# Patient Record
Sex: FEMALE | Race: AMERICAN INDIAN OR ALASKA NATIVE | HISPANIC OR LATINO | Employment: FULL TIME | URBAN - METROPOLITAN AREA
[De-identification: names, ages, dates, MRNs, and addresses within clinical notes are randomized per-mention and may not be internally consistent; named-entity substitution may affect disease eponyms.]

---

## 2023-06-15 ENCOUNTER — TELEPHONE (OUTPATIENT)
Dept: PLASTIC SURGERY | Facility: CLINIC | Age: 43
End: 2023-06-15
Payer: COMMERCIAL

## 2023-06-15 DIAGNOSIS — F64.0 GENDER DYSPHORIA IN ADULT: Primary | ICD-10-CM

## 2023-06-15 NOTE — TELEPHONE ENCOUNTER
[facility] Call Center    Phone Message    May a detailed message be left on voicemail: yes     Reason for Call: Other: Online request - Facial feminization surgeries + Gender Reassignment       Pronouns: She/Her/Hers     Action Taken: Message routed to:  [facility] ([facility] Care     Travel Screening: Not Applicable

## 2023-06-16 NOTE — TELEPHONE ENCOUNTER
FUTURE VISIT INFORMATION      FUTURE VISIT INFORMATION:    Date: 8/21/23    Time: 9:00am    [place] [facility] [place] INFORMATION:    Reason for visit/diagnosis  virtual full depth vaginoplasty     RECORDS REQUESTED FROM:       No recs to collect

## 2023-06-27 ENCOUNTER — TELEPHONE (OUTPATIENT)
Dept: PLASTIC SURGERY | Facility: CLINIC | Age: 43
End: 2023-06-27
Payer: COMMERCIAL

## 2023-06-27 NOTE — CONFIDENTIAL NOTE
Writer called re: need to reschedule 8/21/23 virtual consult with Deena Wallace. Proivider will be out of office. Pt rescheduled for 7/31/23.

## 2023-07-03 NOTE — TELEPHONE ENCOUNTER
FUTURE VISIT INFORMATION      FUTURE VISIT INFORMATION:    Date: 7/31/23    Time: 9:00am    [place] [facility] [place]  REFERRAL INFORMATION:    Reason for visit/diagnosis  virtual full depth vaginoplasty      RECORDS REQUESTED FROM:         No recs to collect

## 2023-07-18 ENCOUNTER — MEDICAL CORRESPONDENCE (OUTPATIENT)
Dept: HEALTH INFORMATION MANAGEMENT | Facility: CLINIC | Age: 43
End: 2023-07-18

## 2023-07-31 ENCOUNTER — PRE VISIT (OUTPATIENT)
Dept: PLASTIC SURGERY | Facility: CLINIC | Age: 43
End: 2023-07-31

## 2023-07-31 ENCOUNTER — VIRTUAL VISIT (OUTPATIENT)
Dept: PLASTIC SURGERY | Facility: CLINIC | Age: 43
End: 2023-07-31
Attending: UROLOGY
Payer: COMMERCIAL

## 2023-07-31 VITALS — HEIGHT: 71 IN | BODY MASS INDEX: 36.4 KG/M2 | WEIGHT: 260 LBS

## 2023-07-31 DIAGNOSIS — F64.0 GENDER DYSPHORIA IN ADULT: ICD-10-CM

## 2023-07-31 PROCEDURE — 99205 OFFICE O/P NEW HI 60 MIN: CPT | Mod: VID | Performed by: NURSE PRACTITIONER

## 2023-07-31 RX ORDER — PROGESTERONE 200 MG/1
200 CAPSULE ORAL
COMMUNITY
Start: 2022-11-16 | End: 2023-11-21

## 2023-07-31 RX ORDER — ESTRADIOL 2 MG/1
3 TABLET ORAL
COMMUNITY
Start: 2021-06-09 | End: 2024-05-03

## 2023-07-31 ASSESSMENT — PAIN SCALES - GENERAL: PAINLEVEL: NO PAIN (0)

## 2023-07-31 ASSESSMENT — PATIENT HEALTH QUESTIONNAIRE - PHQ9: SUM OF ALL RESPONSES TO PHQ QUESTIONS 1-9: 23

## 2023-07-31 NOTE — LETTER
7/31/2023       RE: Wandy Velazco  [address]  [facility] [address]       Dear Colleague,    Thank you for referring your patient, Wandy Velazco, to the [facility] [place] [facility]. Please see a copy of my visit note below.    Name: Wandy Velazco     MRN: [id number]   YOB: 1980                 Chief Complaint:   Gender Dysphoria            History of Present Illness:   Wandy is a 42 year old transgender female seen in consultation for gender dysphoria    Patient transitioned starting fall 2020  Preferred pronouns are: she/her/hers  The patient has been on exogenous hormones since: July 2021 (estradiol and progesterone). Off feliz for approx 10 months now.  In terms of an intimate relationship, the patient does not have a current significant other. Her oldest lives with her ex wife.  In terms of fertility, the patient: has three biologic children. She lives with her two youngest children    (New)  The patient has obtained one letter of support from her therapist. It has not yet been reviewed     (Prior Surgery)  The patient has previously undergone no gender surgeries.  Breast aug (consult in Oct with Gudelia).     Long-term surgical goals for the patient include: breast augmentation and full depth vaginoplasty    The patient is here today expressing interest in full depth vaginoplasty.    The patient has not begun hair removal. She lives in [place], [place] and resources for hair removal are limited    She works as an  with [employer].         Past Medical History:   No past medical history on file.  Depression - HRT and therapy are helping  Gender dysphoria           Past Surgical History:   No past surgical history on file.         Social History:     Social History     Tobacco Use    Smoking status: Former     Packs/day: 0.50     Types: Cigarettes, Clove cigarettes or kreteks  "    Quit date: 1/1/2008     Years since quitting: 15.5    Smokeless tobacco: Never   Substance Use Topics    Alcohol use: Not on file            Family History:   No family history on file.           Allergies:   No Known Allergies         Medications:     Current Outpatient Medications   Medication Sig    estradiol (ESTRACE) 2 MG tablet Take 3 mg by mouth    progesterone (PROMETRIUM) 200 MG capsule Take 200 mg by mouth     No current facility-administered medications for this visit.             Review of Systems:    ROS: ROS neg other than the symptoms noted above in the HPI.          Physical Exam:   Ht 1.803 m (5' 11\")   Wt 117.9 kg (260 lb)   BMI 36.26 kg/m    General: age-appropriate in NAD  HEENT: Head AT/NC, CN Grossly intact  Resp: no respiratory distress  :  exam deferred   Neuro: grossly intact           Assessment and Plan:   42 year old transgender female with gender dysphoria    The criteria for genital surgery are specific to the type of surgery being requested.  Criteria for bottom surgery:    1. Persistent, well documented gender dysphoria;  2. Capacity to make a fully informed decision and to consent for treatment;  3. Age of consent (>18 years old)  4. If significant medical or mental health concerns are present, they must be well controlled.  5. 12 continuous months of hormone therapy as appropriate to the patient s gender goals (unless  the patient has a medical contraindication or is otherwise unable or unwilling to take  Hormones).  6. Two letters of support    The aim of hormone therapy prior to gonadectomy is primarily to introduce a period of reversible  estrogen or testosterone suppression, before the patient undergoes irreversible surgical intervention.    I reviewed the steps of orchiectomy. I reviewed the surgical procedure. I reviewed the risks and benefits including bleeding, infection and irreversible nature of the procedure. The patient would like orchi as part of " vaginoplasty.    Hair removal is a requirement prior to full depth vaginoplasty as the genital skin will be placed in the vaginal cavity. Lack of hair removal would lead to complications related to intravaginal hair. This is nearly impossible to remove postoperatively.    She has a persistent, well documented gender dysphoria. She has capacity to make a fully informed decision and to consent for treatment. Her mental health issues are well controlled. She has been on continuous hormones for years. She has one letter of support.     The patient meets all of these criteria. We discussed that gender affirmation surgery should be considered permanent. We discussed risks/complications of rectal injury, rectovaginal fistula, bleeding, fluid collection, infection, injury to surrounding structures, flap loss, sensory loss, wound dehiscence, vaginal prolapse, vaginal shrinkage/stenosis, need for lifelong dilation, urinary stream abnormalities, DVT/PE and need for revision surgery.     We discussed the option for minimal depth and full depth. She would like full depth vaginoplasty     We also discussed the need to stop hormones jhonny-procedurally for 1 week before and after surgery.     We discussed that transgender vaginoplasty for this patient would include: penectomy, orchiectomy, clitoroplasty, labiaplasty, urethral reconstruction, creation of a vagina, skin graft, colpopexy to suspend the vagina, and scrotectomy.       Needs hair removal  Not ready for prior auth      Plan: Patient will get started with hair removal and upload LOS for review. RTC for hair check once done with hair removal    F/U: Patient to contact us once done with hair removal to schedule hair check exam with Dr Witt    60 minutes spent on day of encounter doing chart review, history and exam, consultation and education, and additional activities as including in note above.          Again, thank you for allowing me to participate in the care of your  patient.      Sincerely,    KYARA Ruiz CNP

## 2023-07-31 NOTE — NURSING NOTE
Is the patient currently in the state of [place]? YES    Visit mode:VIDEO    If the visit is dropped, the patient can be reconnected by: VIDEO VISIT: Text to cell phone: [phone]    Will anyone else be joining the visit? NO      How would you like to obtain your AVS? MyChart    Are changes needed to the allergy or medication list? NO    Reason for visit: Consult    Depression Response    Patient completed the PHQ-9 assessment for depression and scored >9? Yes  Question 9 on the PHQ-9 was positive for suicidality? Yes  Does patient have current mental health provider? Yes    Is this a virtual visit? Yes   Does patient have suicidal ideation (positive question 9)? No - offer to place Mental Health Referral.  Patient declined referral/not needed    I personally notified the following: visit provider

## 2023-07-31 NOTE — PATIENT INSTRUCTIONS
It was wonderful to meet you, Wnady.  Please upload your letter of support through [employer] for review.   It is likely we may need an updated letter for your prior authorization, since the letters ar only good for 1 year. It is helpful for us to review your current letter, though, so we can suggest any necessary updates if needed.  Our comprehensive gender care team is here to support you throughout this process. Please reach out with any questions or concerns.  Thank you for trusting us with your care!

## 2023-07-31 NOTE — PROGRESS NOTES
Virtual Visit Details    Type of service:  Video Visit     Originating Location (pt. Location): Home    Distant Location (provider location):  Off-site  Platform used for Video Visit: [employer]  Video start: 0858  Video end: 0945        Name: Wandy Velazco     MRN: [id number]   YOB: 1980                 Chief Complaint:   Gender Dysphoria            History of Present Illness:   Wandy is a 42 year old transgender female seen in consultation for gender dysphoria    Patient transitioned starting fall 2020  Preferred pronouns are: she/her/hers  The patient has been on exogenous hormones since: July 2021 (estradiol and progesterone). Off feliz for approx 10 months now.  In terms of an intimate relationship, the patient does not have a current significant other. Her oldest lives with her ex wife.  In terms of fertility, the patient: has three biologic children. She lives with her two youngest children    (New)  The patient has obtained one letter of support from her therapist. It has not yet been reviewed     (Prior Surgery)  The patient has previously undergone no gender surgeries.  Breast aug (consult in Oct with Gudelia).     Long-term surgical goals for the patient include: breast augmentation and full depth vaginoplasty    The patient is here today expressing interest in full depth vaginoplasty.    The patient has not begun hair removal. She lives in [place], [place] and resources for hair removal are limited    She works as an  with [employer].         Past Medical History:   No past medical history on file.  Depression - HRT and therapy are helping  Gender dysphoria           Past Surgical History:   No past surgical history on file.         Social History:     Social History     Tobacco Use    Smoking status: Former     Packs/day: 0.50     Types: Cigarettes, Clove cigarettes or kreteks     Quit date: 1/1/2008     Years since quitting: 15.5    Smokeless tobacco: Never  "  Substance Use Topics    Alcohol use: Not on file            Family History:   No family history on file.           Allergies:   No Known Allergies         Medications:     Current Outpatient Medications   Medication Sig    estradiol (ESTRACE) 2 MG tablet Take 3 mg by mouth    progesterone (PROMETRIUM) 200 MG capsule Take 200 mg by mouth     No current facility-administered medications for this visit.             Review of Systems:    ROS: ROS neg other than the symptoms noted above in the HPI.          Physical Exam:   Ht 1.803 m (5' 11\")   Wt 117.9 kg (260 lb)   BMI 36.26 kg/m    General: age-appropriate in NAD  HEENT: Head AT/NC, CN Grossly intact  Resp: no respiratory distress  :  exam deferred   Neuro: grossly intact           Assessment and Plan:   42 year old transgender female with gender dysphoria    The criteria for genital surgery are specific to the type of surgery being requested.  Criteria for bottom surgery:    1. Persistent, well documented gender dysphoria;  2. Capacity to make a fully informed decision and to consent for treatment;  3. Age of consent (>18 years old)  4. If significant medical or mental health concerns are present, they must be well controlled.  5. 12 continuous months of hormone therapy as appropriate to the patient s gender goals (unless  the patient has a medical contraindication or is otherwise unable or unwilling to take  Hormones).  6. Two letters of support    The aim of hormone therapy prior to gonadectomy is primarily to introduce a period of reversible  estrogen or testosterone suppression, before the patient undergoes irreversible surgical intervention.    I reviewed the steps of orchiectomy. I reviewed the surgical procedure. I reviewed the risks and benefits including bleeding, infection and irreversible nature of the procedure. The patient would like orchi as part of vaginoplasty.    Hair removal is a requirement prior to full depth vaginoplasty as the genital " skin will be placed in the vaginal cavity. Lack of hair removal would lead to complications related to intravaginal hair. This is nearly impossible to remove postoperatively.    She has a persistent, well documented gender dysphoria. She has capacity to make a fully informed decision and to consent for treatment. Her mental health issues are well controlled. She has been on continuous hormones for years. She has one letter of support.     The patient meets all of these criteria. We discussed that gender affirmation surgery should be considered permanent. We discussed risks/complications of rectal injury, rectovaginal fistula, bleeding, fluid collection, infection, injury to surrounding structures, flap loss, sensory loss, wound dehiscence, vaginal prolapse, vaginal shrinkage/stenosis, need for lifelong dilation, urinary stream abnormalities, DVT/PE and need for revision surgery.     We discussed the option for minimal depth and full depth. She would like full depth vaginoplasty     We also discussed the need to stop hormones jhonny-procedurally for 1 week before and after surgery.     We discussed that transgender vaginoplasty for this patient would include: penectomy, orchiectomy, clitoroplasty, labiaplasty, urethral reconstruction, creation of a vagina, skin graft, colpopexy to suspend the vagina, and scrotectomy.       Needs hair removal  Not ready for prior auth      Plan: Patient will get started with hair removal and upload LOS for review. RTC for hair check once done with hair removal    F/U: Patient to contact us once done with hair removal to schedule hair check exam with KYARA Santos, [facility]    60 minutes spent on day of encounter doing chart review, history and exam, consultation and education, and additional activities as including in note above.

## 2023-08-21 ENCOUNTER — PRE VISIT (OUTPATIENT)
Dept: PLASTIC SURGERY | Facility: CLINIC | Age: 43
End: 2023-08-21

## 2023-08-30 ENCOUNTER — DOCUMENTATION ONLY (OUTPATIENT)
Dept: PLASTIC SURGERY | Facility: CLINIC | Age: 43
End: 2023-08-30
Payer: COMMERCIAL

## 2023-08-30 NOTE — PROGRESS NOTES
[facility] :  Care Coordination Note     SITUATION   Wandy Velazco is a 43 year old (she/her) adult who is receiving support for:  Care Team  .    BACKGROUND     One physical LOS copy was received. LOS needs updates. Faxed LOS to HIM. Sent message to pt re: LOS requirements for her insurance. Pt has breast aug consult on 10/23.     Two LOS is needed for full depth vaginoplasty. Pt will need to complete hair removal. Sent message to pt stating LOS can be completed closer to completion of hair removal.     2/6/24  LOS for top surgery received and meets criteria. Updated pt list.  ASSESSMENT     Surgery              [facility] Assessment  [facility] [place] [place]: Enrolled  Patient has a therapist: Yes  Name of therapist: Brittany Barclay  Letter of support #1: Received  Letter #1 Date: 07/18/23  Surgery being considered: Yes      PLAN          Nursing Interventions:       Follow-up plan:  Pt will upload two LOS for full depth vaginoplasty closer to completion of hair removal. Surgeon will place CR for breast aug.    JUSTINO Orosco

## 2024-01-25 ENCOUNTER — OFFICE VISIT (OUTPATIENT)
Dept: PLASTIC SURGERY | Facility: AMBULATORY SURGERY CENTER | Age: 44
End: 2024-01-25
Payer: COMMERCIAL

## 2024-01-25 VITALS
HEIGHT: 71 IN | SYSTOLIC BLOOD PRESSURE: 142 MMHG | WEIGHT: 269 LBS | BODY MASS INDEX: 37.66 KG/M2 | DIASTOLIC BLOOD PRESSURE: 82 MMHG

## 2024-01-25 DIAGNOSIS — F64.9 GENDER DYSPHORIA: ICD-10-CM

## 2024-01-25 DIAGNOSIS — Z98.82 S/P BREAST AUGMENTATION: Primary | ICD-10-CM

## 2024-01-25 PROCEDURE — 99215 OFFICE O/P EST HI 40 MIN: CPT | Performed by: PLASTIC SURGERY

## 2024-01-25 RX ORDER — PROGESTERONE 200 MG/1
CAPSULE ORAL
COMMUNITY

## 2024-01-25 RX ORDER — ACETAMINOPHEN 500 MG
TABLET ORAL
COMMUNITY

## 2024-01-25 NOTE — PROGRESS NOTES
"Chief complaint:  Gender dysphoria, consultation for top surgery     History of present illness:  This is a 43 year old trans female who comes today for consultation regarding top surgery.  Pronouns she/her/hers.  Patient's name is Wandy.  At this time the letter of support is pending. Currently she is on estrogen treatment.  She has been receiving estrogen for the last 2 and half years years.  This patient has been dealing with gender dysphoria for the last 40 years.     Past medical history:  History reviewed. No pertinent past medical history.    Gender dysphoria     Past surgical history:  Denies     Allergies:  No known drug allergies     Medications:    Current Outpatient Medications:     acetaminophen (TYLENOL) 500 MG tablet, Take by mouth, Disp: , Rfl:     estradiol (ESTRACE) 2 MG tablet, Take 3 mg by mouth, Disp: , Rfl:     progesterone (PROMETRIUM) 200 MG capsule, TAKE 1 CAPSULE BY MOUTH ONCE DAILY EVERY NIGHT AT BEDTIME, Disp: , Rfl:      Family history:  Noncontributory     Social History:  Denies tobacco, drinks alcohol socially     Review of systems:  General ROS: No complaints or constitutional symptoms  Skin: No complaints or symptoms   Hematologic/Lymphatic: No symptoms or complaints  Psychiatric: Patient presents with gender dysphoria  Endocrine: No excessive fatigue, no hypermetabolic symptoms reported  Respiratory ROS: No cough, shortness of breath, or wheezing  Cardiovascular ROS: No chest pain or dyspnea on exertion  Breast ROS: Denies nipple discharge, denies palpable breast masses, denies peau d'orange.  Gastrointestinal ROS: No abdominal pain, nausea, diarrhea, or constipation  Musculoskeletal ROS: No recent injuries reported  Neurological ROS: No focal neurologic defects reported.       Physical exam:     BP (!) 142/82   Ht 1.803 m (5' 11\")   Wt 122 kg (269 lb)   BMI 37.52 kg/m    General: Alert, cooperative, appears stated age   Skin: Skin color, texture, turgor normal, no rashes or " "lesions   Lymphatic: No obvious adenopathy, no swelling   Eyes: No scleral icterus, pupils equal  HENT: No traumatic injury to the head or face, no gross abnormalities  Lungs: Normal respiratory effort, breath sounds equal bilaterally  Heart: Regular rate and rhythm  Breasts:  Bilateral glandular hypertrophy, with grade 1 ptosis.  No nipple inversion, no nipple discharge, no palpable breast masses.  Right nipple areolar complex is slightly higher compared to the left one.  On the right breast sternal notch to nipple distance is 27 cm, nipple to inframammary fold is 5 cm, and breast diameter is 16 cm.  Of the left breast sternal notch to nipple distance is 28 cm, nipple to inframammary fold distance is 4 cm, and breast diameter is 15 cm.  Pinch thickness test on the skin of both breasts is 4 cm.  No palpable axillary lymphadenopathies bilaterally.  Abdomen: Soft, non-distended and non-tender to palpation  Neurologic: Grossly intact                                Assessment:     43 year old  trans female with history of gender dysphoria.     PLAN:      I believe that Wandy is a good candidate for gender affirming top surgery with bilateral prepectoral augmentation mammoplasties (her skin pinch test thickness is greater than 2 cm), via inframammary fold approach, with lowering of the inframammary fold at least 2 cm down from its current position (soft tissue rearrangement).  Furthermore, she will need obliteration of the current inframammary fold which is at least 2 cm higher from where is supposed to be.  Finally, she will need scoring of her bilateral glands.  Based upon her breast diameter I will bring to the operating room moderate profile, cohesive, breast implants with 685 cc, and 755 cc.  I will utilize corresponding sizers as well.     \"According to [place] case law and ATH standards of care, with an appropriate letter of support from a mental health provider, top surgery/mastectomy is medically necessary " "for the treatment of gender dysphoria.\"     I discussed with Wandy the risks of surgery and they include but are not limited to scarring, large scar in the inframammary fold, keloid formation, infection requiring explantation and secondary augmentation at least 6 to 12 months after explantation, bleeding, hematoma, seroma, contour deformities, lack of sensation on the chest and nipple areolar complex, capsular contracture, implant malposition, need for further surgeries.     Patient did acknowledge all of these risks and has agreed to proceed.     We will obtain a letter of support from her therapist and then we will schedule her for surgery: bilateral prepectoral augmentation mammoplasties with lowering of bilateral inframammary folds as a soft tissue rearrangement.     Time spent with the patient 45 minutes.    Noah Galeas MD , FACS   Diplomate American Board of Plastic Surgery  Diplomate American Board of Surgery  Adj. Assistant Professor of Surgery  Division of Plastic & Reconstructive Surgery   [facility]  Office: [phone]   1/25/2024 at 3:28 PM   "

## 2024-01-25 NOTE — NURSING NOTE
Patient here today for gender affirming breast augmentation. Arrived today from [place] via train.  The patient has a letter of support that was sent to [facility] back in August 2023 but needs revisions. Patient will reach out to therapist to obtain revision and re-submit LOS. Former smoker, quit 2008.    Aura Christensen RN on 1/25/2024 at 3:33 PM

## 2024-01-25 NOTE — LETTER
1/25/2024         RE: Wandy Velazco  [address]  [place] [facility] [address]        Dear Colleague,    Thank you for referring your patient, Wandy Velazco, to the [facility] [place]. Please see a copy of my visit note below.    Chief complaint:  Gender dysphoria, consultation for top surgery     History of present illness:  This is a 43 year old trans female who comes today for consultation regarding top surgery.  Pronouns she/her/hers.  Patient's name is Wandy.  At this time the letter of support is pending. Currently she is on estrogen treatment.  She has been receiving estrogen for the last 2 and half years years.  This patient has been dealing with gender dysphoria for the last 40 years.     Past medical history:  History reviewed. No pertinent past medical history.    Gender dysphoria     Past surgical history:  Denies     Allergies:  No known drug allergies     Medications:    Current Outpatient Medications:      acetaminophen (TYLENOL) 500 MG tablet, Take by mouth, Disp: , Rfl:      estradiol (ESTRACE) 2 MG tablet, Take 3 mg by mouth, Disp: , Rfl:      progesterone (PROMETRIUM) 200 MG capsule, TAKE 1 CAPSULE BY MOUTH ONCE DAILY EVERY NIGHT AT BEDTIME, Disp: , Rfl:      Family history:  Noncontributory     Social History:  Denies tobacco, drinks alcohol socially     Review of systems:  General ROS: No complaints or constitutional symptoms  Skin: No complaints or symptoms   Hematologic/Lymphatic: No symptoms or complaints  Psychiatric: Patient presents with gender dysphoria  Endocrine: No excessive fatigue, no hypermetabolic symptoms reported  Respiratory ROS: No cough, shortness of breath, or wheezing  Cardiovascular ROS: No chest pain or dyspnea on exertion  Breast ROS: Denies nipple discharge, denies palpable breast masses, denies peau d'orange.  Gastrointestinal ROS: No abdominal pain, nausea, diarrhea, or constipation  Musculoskeletal ROS: No recent injuries  "reported  Neurological ROS: No focal neurologic defects reported.       Physical exam:     BP (!) 142/82   Ht 1.803 m (5' 11\")   Wt 122 kg (269 lb)   BMI 37.52 kg/m    General: Alert, cooperative, appears stated age   Skin: Skin color, texture, turgor normal, no rashes or lesions   Lymphatic: No obvious adenopathy, no swelling   Eyes: No scleral icterus, pupils equal  HENT: No traumatic injury to the head or face, no gross abnormalities  Lungs: Normal respiratory effort, breath sounds equal bilaterally  Heart: Regular rate and rhythm  Breasts:  Bilateral glandular hypertrophy, with grade 1 ptosis.  No nipple inversion, no nipple discharge, no palpable breast masses.  Right nipple areolar complex is slightly higher compared to the left one.  On the right breast sternal notch to nipple distance is 27 cm, nipple to inframammary fold is 5 cm, and breast diameter is 16 cm.  Of the left breast sternal notch to nipple distance is 28 cm, nipple to inframammary fold distance is 4 cm, and breast diameter is 15 cm.  Pinch thickness test on the skin of both breasts is 4 cm.  No palpable axillary lymphadenopathies bilaterally.  Abdomen: Soft, non-distended and non-tender to palpation  Neurologic: Grossly intact                                Assessment:     43 year old  trans female with history of gender dysphoria.     PLAN:      I believe that Wandy is a good candidate for gender affirming top surgery with bilateral prepectoral augmentation mammoplasties (her skin pinch test thickness is greater than 2 cm), via inframammary fold approach, with lowering of the inframammary fold at least 2 cm down from its current position (soft tissue rearrangement).  Furthermore, she will need obliteration of the current inframammary fold which is at least 2 cm higher from where is supposed to be.  Finally, she will need scoring of her bilateral glands.  Based upon her breast diameter I will bring to the operating room moderate profile, " "cohesive, breast implants with 685 cc, and 755 cc.  I will utilize corresponding sizers as well.     \"According to [place] case law and [facility] standards of care, with an appropriate letter of support from a mental health provider, top surgery/mastectomy is medically necessary for the treatment of gender dysphoria.\"     I discussed with Wandy the risks of surgery and they include but are not limited to scarring, large scar in the inframammary fold, keloid formation, infection requiring explantation and secondary augmentation at least 6 to 12 months after explantation, bleeding, hematoma, seroma, contour deformities, lack of sensation on the chest and nipple areolar complex, capsular contracture, implant malposition, need for further surgeries.     Patient did acknowledge all of these risks and has agreed to proceed.     We will obtain a letter of support from her therapist and then we will schedule her for surgery: bilateral prepectoral augmentation mammoplasties with lowering of bilateral inframammary folds as a soft tissue rearrangement.     Time spent with the patient 45 minutes.    Noah Galeas MD , FACS   Diplomate American Board of Plastic Surgery  Diplomate American Board of Surgery  Adj. Assistant Professor of Surgery  Division of Plastic & Reconstructive Surgery   [facility]  Office: [phone]   1/25/2024 at 3:28 PM       Again, thank you for allowing me to participate in the care of your patient.        Sincerely,        Noah Galeas MD  "

## 2024-02-22 ENCOUNTER — TELEPHONE (OUTPATIENT)
Dept: PLASTIC SURGERY | Facility: AMBULATORY SURGERY CENTER | Age: 44
End: 2024-02-22
Payer: COMMERCIAL

## 2024-02-22 PROBLEM — F64.9 GENDER DYSPHORIA: Status: ACTIVE | Noted: 2024-01-25

## 2024-02-22 NOTE — LETTER
Pre-op Physical:  Schedule a pre-op physical with your primary care doctor if not internal to [facility].  If internal, we have scheduled this.   The pre-op physical must be 10-30 days before surgery and since it is required by anesthesia, your surgery will be cancelled if it's not done.      Surgery Date: 4/16/2024     [facility]    [address], [address], [place]  [address]    Approximate Arrival Time: 10:00 am  (Unless instructed differently by the pre-op call nurse)     Post op Appointment: 4/18/2024 at 2:00 pm with  Dr. Galeas  [facility] [place]   [facility], [place] [address].      Pre-Surgical Tasks:     Review all medications with your primary care or prescribing physician; they will advise you which meds to stop and when, and when you can resume taking.  Certain medications like blood thinners and weight loss medications need to be stopped in advance of surgery to proceed safely.      Blood thinners including but not exclusive to drugs like Xarelto, Eliquis, Warfarin and Aspirin, should be stopped five days before surgery, if your prescribing provider agrees. Follow your provider's advice on stopping blood thinners because they know you best.  If you are unsure if your medication is a blood thinner, ask your prescribing provider.    Weight loss medications: There are multiple medications being used for weight management and diabetes today, and the list is growing.  Phentermine, Ozempic, Wegovy, Trulicity, and other similar medications need to be stopped one week before surgery to avoid being cancelled.  Victoza and Saxenda can be continued longer but must be stopped one full day before surgery.  Please ask your prescribing provider for advice.    Diabetic medications: in addition to the medications talked about above that are used for either weight loss or diabetes, some  people are on insulin that may require adjustment.  Please discuss managing diabetic medications with your prescribing doctor as these medications may require modification prior to surgery.     Please shower the evening before and morning of surgery with Hibiclens soap.  Any [facility] can provide this to you at no cost, or it can be found at your local pharmacy.     Fasting instructions will be provided by the pre-op nurse who will call you 1-3 days before surgery.  Typically, we advise normal food up to 8 hours before you arrive for surgery. Clear liquids only from then until 2 hours before you arrive surgery, then nothing at all by mouth.  The nurse will review your specific instructions with you at the call.      Smoking impacts your body's ability to heal properly so we advise patients to quit if possible before surgery.  Plastic Surgery patients are required to be nicotine free for at least 8 weeks before surgery.      You will need an adult to drive you home and stay with you 24 hours after surgery. Public transportation or Medical Van Services are not permitted.    Visitor restrictions are subject to change, please verify with the pre-op nurse when they call how many people are permitted to accompany you.    We always encourage you to notify your insurance any time you have medical tests or procedures scheduled including surgery. The number is usually right on the back of your insurance card. To obtain pricing for surgery, please call [facility] Cost of Care at [phone] or email [email or web address].        Call our office if you have any questions! Thank you!     Mery Garland MA  [facility]   (General Surgery/ ENT/ Plastics)  Direct Office: [phone]

## 2024-02-22 NOTE — TELEPHONE ENCOUNTER
Spoke with patient today regarding surgery scheduling     Went over details/instructions. Patient will schedule H&P with their external PCP.    Surgery Letter sent via [employer]  (Please see LETTERS TAB in chart to retrieve a copy of this letter)

## 2024-03-18 ENCOUNTER — TELEPHONE (OUTPATIENT)
Dept: PLASTIC SURGERY | Facility: CLINIC | Age: 44
End: 2024-03-18
Payer: COMMERCIAL

## 2024-03-19 ENCOUNTER — TELEPHONE (OUTPATIENT)
Dept: PLASTIC SURGERY | Facility: CLINIC | Age: 44
End: 2024-03-19
Payer: COMMERCIAL

## 2024-04-11 ENCOUNTER — ANESTHESIA EVENT (OUTPATIENT)
Dept: SURGERY | Facility: AMBULATORY SURGERY CENTER | Age: 44
End: 2024-04-11
Payer: COMMERCIAL

## 2024-04-12 RX ORDER — CEPHALEXIN 500 MG/1
500 CAPSULE ORAL 3 TIMES DAILY
Qty: 21 CAPSULE | Refills: 0 | Status: SHIPPED | OUTPATIENT
Start: 2024-04-12

## 2024-04-12 RX ORDER — MUPIROCIN 20 MG/G
OINTMENT TOPICAL 3 TIMES DAILY
Qty: 30 G | Refills: 0 | Status: SHIPPED | OUTPATIENT
Start: 2024-04-12 | End: 2024-04-16

## 2024-04-12 RX ORDER — ONDANSETRON 4 MG/1
4 TABLET, FILM COATED ORAL EVERY 6 HOURS PRN
Qty: 16 TABLET | Refills: 0 | Status: SHIPPED | OUTPATIENT
Start: 2024-04-12

## 2024-04-12 RX ORDER — OXYCODONE AND ACETAMINOPHEN 5; 325 MG/1; MG/1
1 TABLET ORAL EVERY 6 HOURS PRN
Qty: 21 TABLET | Refills: 0 | Status: SHIPPED | OUTPATIENT
Start: 2024-04-12 | End: 2024-04-17

## 2024-04-12 RX ORDER — DOCUSATE SODIUM 100 MG/1
100 CAPSULE, LIQUID FILLED ORAL 2 TIMES DAILY
Qty: 20 CAPSULE | Refills: 0 | Status: SHIPPED | OUTPATIENT
Start: 2024-04-12

## 2024-04-12 NOTE — PROGRESS NOTES
Prescriptions sent to preferred pharmacy.    Noah Galeas MD , FACS   Diplomate American Board of Plastic Surgery  Diplomate American Board of Surgery  Adj. Assistant Professor of Surgery  Division of Plastic & Reconstructive Surgery   [facility]  Office: [phone]   4/12/2024 at 1:06 PM

## 2024-04-16 ENCOUNTER — HOSPITAL ENCOUNTER (OUTPATIENT)
Facility: AMBULATORY SURGERY CENTER | Age: 44
Discharge: HOME | End: 2024-04-16
Attending: PLASTIC SURGERY | Admitting: PLASTIC SURGERY
Payer: COMMERCIAL

## 2024-04-16 ENCOUNTER — ANESTHESIA (OUTPATIENT)
Dept: SURGERY | Facility: AMBULATORY SURGERY CENTER | Age: 44
End: 2024-04-16
Payer: COMMERCIAL

## 2024-04-16 VITALS
HEIGHT: 71 IN | BODY MASS INDEX: 38.5 KG/M2 | DIASTOLIC BLOOD PRESSURE: 60 MMHG | RESPIRATION RATE: 14 BRPM | TEMPERATURE: 96.9 F | WEIGHT: 275 LBS | SYSTOLIC BLOOD PRESSURE: 107 MMHG | HEART RATE: 57 BPM | OXYGEN SATURATION: 96 %

## 2024-04-16 PROCEDURE — 14301 TIS TRNFR ANY 30.1-60 SQ CM: CPT

## 2024-04-16 PROCEDURE — 19325 BREAST AUGMENTATION W/IMPLT: CPT | Performed by: NURSE ANESTHETIST, CERTIFIED REGISTERED

## 2024-04-16 PROCEDURE — 19325 BREAST AUGMENTATION W/IMPLT: CPT | Performed by: ANESTHESIOLOGY

## 2024-04-16 PROCEDURE — 14001 TIS TRNFR TRUNK 10.1-30SQCM: CPT | Mod: XS

## 2024-04-16 PROCEDURE — 19325 BREAST AUGMENTATION W/IMPLT: CPT | Mod: 50 | Performed by: PLASTIC SURGERY

## 2024-04-16 PROCEDURE — 14301 TIS TRNFR ANY 30.1-60 SQ CM: CPT | Performed by: PLASTIC SURGERY

## 2024-04-16 PROCEDURE — 19325 BREAST AUGMENTATION W/IMPLT: CPT | Mod: 50

## 2024-04-16 PROCEDURE — 14001 TIS TRNFR TRUNK 10.1-30SQCM: CPT | Mod: XS | Performed by: PLASTIC SURGERY

## 2024-04-16 DEVICE — IMPLANTABLE DEVICE: Type: IMPLANTABLE DEVICE | Site: BREAST | Status: FUNCTIONAL

## 2024-04-16 RX ORDER — LIDOCAINE HYDROCHLORIDE 20 MG/ML
INJECTION, SOLUTION INFILTRATION; PERINEURAL PRN
Status: DISCONTINUED | OUTPATIENT
Start: 2024-04-16 | End: 2024-04-16

## 2024-04-16 RX ORDER — ONDANSETRON 2 MG/ML
4 INJECTION INTRAMUSCULAR; INTRAVENOUS EVERY 30 MIN PRN
Status: DISCONTINUED | OUTPATIENT
Start: 2024-04-16 | End: 2024-04-17 | Stop reason: HOSPADM

## 2024-04-16 RX ORDER — ACETAMINOPHEN 325 MG/1
975 TABLET ORAL ONCE
Status: COMPLETED | OUTPATIENT
Start: 2024-04-16 | End: 2024-04-16

## 2024-04-16 RX ORDER — SODIUM CHLORIDE, SODIUM LACTATE, POTASSIUM CHLORIDE, CALCIUM CHLORIDE 600; 310; 30; 20 MG/100ML; MG/100ML; MG/100ML; MG/100ML
INJECTION, SOLUTION INTRAVENOUS CONTINUOUS
Status: DISCONTINUED | OUTPATIENT
Start: 2024-04-16 | End: 2024-04-17 | Stop reason: HOSPADM

## 2024-04-16 RX ORDER — PROPOFOL 10 MG/ML
INJECTION, EMULSION INTRAVENOUS PRN
Status: DISCONTINUED | OUTPATIENT
Start: 2024-04-16 | End: 2024-04-16

## 2024-04-16 RX ORDER — LIDOCAINE 40 MG/G
CREAM TOPICAL
Status: DISCONTINUED | OUTPATIENT
Start: 2024-04-16 | End: 2024-04-17 | Stop reason: HOSPADM

## 2024-04-16 RX ORDER — KETAMINE HYDROCHLORIDE 10 MG/ML
INJECTION INTRAMUSCULAR; INTRAVENOUS PRN
Status: DISCONTINUED | OUTPATIENT
Start: 2024-04-16 | End: 2024-04-16

## 2024-04-16 RX ORDER — FENTANYL CITRATE 50 UG/ML
25 INJECTION, SOLUTION INTRAMUSCULAR; INTRAVENOUS EVERY 5 MIN PRN
Status: DISCONTINUED | OUTPATIENT
Start: 2024-04-16 | End: 2024-04-17 | Stop reason: HOSPADM

## 2024-04-16 RX ORDER — ONDANSETRON 2 MG/ML
4 INJECTION INTRAMUSCULAR; INTRAVENOUS ONCE
Status: COMPLETED | OUTPATIENT
Start: 2024-04-16 | End: 2024-04-16

## 2024-04-16 RX ORDER — ONDANSETRON 4 MG/1
4 TABLET, ORALLY DISINTEGRATING ORAL EVERY 30 MIN PRN
Status: DISCONTINUED | OUTPATIENT
Start: 2024-04-16 | End: 2024-04-17 | Stop reason: HOSPADM

## 2024-04-16 RX ORDER — FENTANYL CITRATE 50 UG/ML
INJECTION, SOLUTION INTRAMUSCULAR; INTRAVENOUS PRN
Status: DISCONTINUED | OUTPATIENT
Start: 2024-04-16 | End: 2024-04-16

## 2024-04-16 RX ORDER — HYDROMORPHONE HYDROCHLORIDE 1 MG/ML
0.2 INJECTION, SOLUTION INTRAMUSCULAR; INTRAVENOUS; SUBCUTANEOUS EVERY 5 MIN PRN
Status: DISCONTINUED | OUTPATIENT
Start: 2024-04-16 | End: 2024-04-17 | Stop reason: HOSPADM

## 2024-04-16 RX ORDER — NALOXONE HYDROCHLORIDE 0.4 MG/ML
0.1 INJECTION, SOLUTION INTRAMUSCULAR; INTRAVENOUS; SUBCUTANEOUS
Status: DISCONTINUED | OUTPATIENT
Start: 2024-04-16 | End: 2024-04-17 | Stop reason: HOSPADM

## 2024-04-16 RX ORDER — HYDROMORPHONE HYDROCHLORIDE 1 MG/ML
0.4 INJECTION, SOLUTION INTRAMUSCULAR; INTRAVENOUS; SUBCUTANEOUS EVERY 5 MIN PRN
Status: DISCONTINUED | OUTPATIENT
Start: 2024-04-16 | End: 2024-04-17 | Stop reason: HOSPADM

## 2024-04-16 RX ORDER — OXYCODONE HYDROCHLORIDE 5 MG/1
10 TABLET ORAL
Status: DISCONTINUED | OUTPATIENT
Start: 2024-04-16 | End: 2024-04-17 | Stop reason: HOSPADM

## 2024-04-16 RX ORDER — CEFAZOLIN SODIUM 2 G/50ML
2 SOLUTION INTRAVENOUS
Status: COMPLETED | OUTPATIENT
Start: 2024-04-16 | End: 2024-04-16

## 2024-04-16 RX ORDER — FENTANYL CITRATE 50 UG/ML
50 INJECTION, SOLUTION INTRAMUSCULAR; INTRAVENOUS EVERY 5 MIN PRN
Status: DISCONTINUED | OUTPATIENT
Start: 2024-04-16 | End: 2024-04-17 | Stop reason: HOSPADM

## 2024-04-16 RX ORDER — DEXAMETHASONE SODIUM PHOSPHATE 4 MG/ML
INJECTION, SOLUTION INTRA-ARTICULAR; INTRALESIONAL; INTRAMUSCULAR; INTRAVENOUS; SOFT TISSUE PRN
Status: DISCONTINUED | OUTPATIENT
Start: 2024-04-16 | End: 2024-04-16

## 2024-04-16 RX ORDER — GLYCOPYRROLATE 0.2 MG/ML
INJECTION, SOLUTION INTRAMUSCULAR; INTRAVENOUS PRN
Status: DISCONTINUED | OUTPATIENT
Start: 2024-04-16 | End: 2024-04-16

## 2024-04-16 RX ORDER — OXYCODONE HYDROCHLORIDE 5 MG/1
5 TABLET ORAL
Status: COMPLETED | OUTPATIENT
Start: 2024-04-16 | End: 2024-04-16

## 2024-04-16 RX ORDER — PROPOFOL 10 MG/ML
INJECTION, EMULSION INTRAVENOUS CONTINUOUS PRN
Status: DISCONTINUED | OUTPATIENT
Start: 2024-04-16 | End: 2024-04-16

## 2024-04-16 RX ADMIN — ONDANSETRON 4 MG: 2 INJECTION INTRAMUSCULAR; INTRAVENOUS at 11:13

## 2024-04-16 RX ADMIN — LIDOCAINE HYDROCHLORIDE 100 MG: 20 INJECTION, SOLUTION INFILTRATION; PERINEURAL at 11:37

## 2024-04-16 RX ADMIN — FENTANYL CITRATE 50 MCG: 50 INJECTION, SOLUTION INTRAMUSCULAR; INTRAVENOUS at 11:30

## 2024-04-16 RX ADMIN — PROPOFOL 200 MCG/KG/MIN: 10 INJECTION, EMULSION INTRAVENOUS at 11:39

## 2024-04-16 RX ADMIN — PROPOFOL 150 MCG/KG/MIN: 10 INJECTION, EMULSION INTRAVENOUS at 13:20

## 2024-04-16 RX ADMIN — KETAMINE HYDROCHLORIDE 20 MG: 10 INJECTION INTRAMUSCULAR; INTRAVENOUS at 12:29

## 2024-04-16 RX ADMIN — OXYCODONE HYDROCHLORIDE 5 MG: 5 TABLET ORAL at 15:24

## 2024-04-16 RX ADMIN — Medication 0.5 MG: at 12:11

## 2024-04-16 RX ADMIN — DEXAMETHASONE SODIUM PHOSPHATE 4 MG: 4 INJECTION, SOLUTION INTRA-ARTICULAR; INTRALESIONAL; INTRAMUSCULAR; INTRAVENOUS; SOFT TISSUE at 11:37

## 2024-04-16 RX ADMIN — PROPOFOL 200 MCG/KG/MIN: 10 INJECTION, EMULSION INTRAVENOUS at 12:44

## 2024-04-16 RX ADMIN — GLYCOPYRROLATE 0.1 MG: 0.2 INJECTION, SOLUTION INTRAMUSCULAR; INTRAVENOUS at 12:31

## 2024-04-16 RX ADMIN — PROPOFOL 50 MG: 10 INJECTION, EMULSION INTRAVENOUS at 12:25

## 2024-04-16 RX ADMIN — PROPOFOL 200 MCG/KG/MIN: 10 INJECTION, EMULSION INTRAVENOUS at 12:20

## 2024-04-16 RX ADMIN — PROPOFOL 50 MG: 10 INJECTION, EMULSION INTRAVENOUS at 12:22

## 2024-04-16 RX ADMIN — ACETAMINOPHEN 975 MG: 325 TABLET ORAL at 10:25

## 2024-04-16 RX ADMIN — Medication 0.5 MG: at 12:33

## 2024-04-16 RX ADMIN — SODIUM CHLORIDE, SODIUM LACTATE, POTASSIUM CHLORIDE, CALCIUM CHLORIDE: 600; 310; 30; 20 INJECTION, SOLUTION INTRAVENOUS at 11:10

## 2024-04-16 RX ADMIN — PROPOFOL 200 MCG/KG/MIN: 10 INJECTION, EMULSION INTRAVENOUS at 13:07

## 2024-04-16 RX ADMIN — CEFAZOLIN SODIUM 2 G: 2 SOLUTION INTRAVENOUS at 11:29

## 2024-04-16 RX ADMIN — SODIUM CHLORIDE, SODIUM LACTATE, POTASSIUM CHLORIDE, CALCIUM CHLORIDE: 600; 310; 30; 20 INJECTION, SOLUTION INTRAVENOUS at 14:12

## 2024-04-16 RX ADMIN — PROPOFOL 200 MG: 10 INJECTION, EMULSION INTRAVENOUS at 11:38

## 2024-04-16 RX ADMIN — FENTANYL CITRATE 50 MCG: 50 INJECTION, SOLUTION INTRAMUSCULAR; INTRAVENOUS at 11:48

## 2024-04-16 NOTE — ANESTHESIA POSTPROCEDURE EVALUATION
Patient: Wandy Velazco    Procedure: Procedure(s):  AUGMENTATION, BREAST BILATERAL       Anesthesia Type:  General    Note:  Disposition: Outpatient   Postop Pain Control: Uneventful            Sign Out: Well controlled pain   PONV: No   Neuro/Psych: Uneventful            Sign Out: Acceptable/Baseline neuro status   Airway/Respiratory: Uneventful            Sign Out: Acceptable/Baseline resp. status   CV/Hemodynamics: Uneventful            Sign Out: Acceptable CV status; No obvious hypovolemia; No obvious fluid overload   Other NRE: NONE   DID A NON-ROUTINE EVENT OCCUR?            Last vitals:  Vitals Value Taken Time   /77 04/16/24 1545   Temp 36.3  C (97.3  F) 04/16/24 1545   Pulse 57 04/16/24 1545   Resp 16 04/16/24 1545   SpO2 96 % 04/16/24 1545       Electronically Signed By: Campos Leblanc MD  April 16, 2024  4:19 PM

## 2024-04-16 NOTE — ANESTHESIA PREPROCEDURE EVALUATION
"Anesthesia Pre-Procedure Evaluation    Patient: Wandy Velazco   MRN: [id number] : 1980        Procedure : Procedure(s):  AUGMENTATION, BREAST BILATERAL          History reviewed. No pertinent past medical history.   History reviewed. No pertinent surgical history.   No Known Allergies   Social History     Tobacco Use    Smoking status: Former     Current packs/day: 0.00     Types: Cigarettes, Clove cigarettes or kreteks     Quit date: 2008     Years since quittin.3    Smokeless tobacco: Never   Substance Use Topics    Alcohol use: Not on file      Wt Readings from Last 1 Encounters:   24 124.7 kg (275 lb)        Anesthesia Evaluation            ROS/MED HX  ENT/Pulmonary:  - neg pulmonary ROS     Neurologic:  - neg neurologic ROS     Cardiovascular:  - neg cardiovascular ROS     METS/Exercise Tolerance: >4 METS    Hematologic:  - neg hematologic  ROS     Musculoskeletal:  - neg musculoskeletal ROS     GI/Hepatic:  - neg GI/hepatic ROS     Renal/Genitourinary:  - neg Renal ROS     Endo:  - neg endo ROS   (+)               Obesity,       Psychiatric/Substance Use:  - neg psychiatric ROS   (+) psychiatric history anxiety and depression       Infectious Disease:  - neg infectious disease ROS     Malignancy:  - neg malignancy ROS     Other:  - neg other ROS          Physical Exam    Airway  airway exam normal      Mallampati: II   TM distance: > 3 FB   Neck ROM: full   Mouth opening: > 3 cm    Respiratory Devices and Support         Dental       (+) Completely normal teeth      Cardiovascular          Rhythm and rate: regular and normal     Pulmonary   pulmonary exam normal        breath sounds clear to auscultation           OUTSIDE LABS:  CBC: No results found for: \"WBC\", \"HGB\", \"HCT\", \"PLT\"  BMP: No results found for: \"NA\", \"POTASSIUM\", \"CHLORIDE\", \"CO2\", \"BUN\", \"CR\", \"GLC\"  COAGS: No results found for: \"PTT\", \"INR\", \"FIBR\"  POC: No results found for: \"BGM\", \"HCG\", \"HCGS\"  HEPATIC: No results " "found for: \"ALBUMIN\", \"PROTTOTAL\", \"ALT\", \"AST\", \"GGT\", \"ALKPHOS\", \"BILITOTAL\", \"BILIDIRECT\", \"TOBIAS\"  OTHER: No results found for: \"PH\", \"LACT\", \"A1C\", \"RAHAT\", \"PHOS\", \"MAG\", \"LIPASE\", \"AMYLASE\", \"TSH\", \"T4\", \"T3\", \"CRP\", \"SED\"    Anesthesia Plan    ASA Status:  2    NPO Status:  NPO Appropriate    Anesthesia Type: General.     - Airway: LMA   Induction: Intravenous.   Maintenance: Balanced.        Consents    Anesthesia Plan(s) and associated risks, benefits, and realistic alternatives discussed. Questions answered and patient/representative(s) expressed understanding.     - Discussed:     - Discussed with:  Patient      - Extended Intubation/Ventilatory Support Discussed: No.      - Patient is DNR/DNI Status: No     Use of blood products discussed: No .     Postoperative Care    Pain management: IV analgesics, Oral pain medications, Multi-modal analgesia.   PONV prophylaxis: Ondansetron (or other 5HT-3), Background Propofol Infusion     Comments:               Regan Villa MD    I have reviewed the pertinent notes and labs in the chart from the past 30 days and (re)examined the patient.  Any updates or changes from those notes are reflected in this note.              # Obesity: Estimated body mass index is 38.37 kg/m  as calculated from the following:    Height as of this encounter: 1.803 m (5' 10.98\").    Weight as of this encounter: 124.7 kg (275 lb).      "

## 2024-04-16 NOTE — ANESTHESIA PROCEDURE NOTES
Airway       Patient location during procedure: OR  Staff -        CRNA: Jody Boudreaux APRN CRNA       Performed By: CRNA  Consent for Airway        Urgency: elective  Indications and Patient Condition       Indications for airway management: jhonny-procedural        Final Airway Details       Final airway type: supraglottic airway    Supraglottic Airway Details        Type: LMA       Brand: LMA Unique       LMA size: 5    Post intubation assessment        Placement verified by: capnometry, equal breath sounds and chest rise        Number of attempts at approach: 1       Secured with: tape       Ease of procedure: easy       Dentition: Intact and Unchanged

## 2024-04-16 NOTE — ANESTHESIA CARE TRANSFER NOTE
Patient: Wandy Velazco    Procedure: Procedure(s):  AUGMENTATION, BREAST BILATERAL       Diagnosis: Gender dysphoria [F64.9]  Diagnosis Additional Information: No value filed.    Anesthesia Type:   General     Note:    Oropharynx: oropharynx clear of all foreign objects  Level of Consciousness: drowsy  Oxygen Supplementation: face mask  Level of Supplemental Oxygen (L/min / FiO2): 6  Independent Airway: airway patency satisfactory and stable  Dentition: dentition unchanged  Vital Signs Stable: post-procedure vital signs reviewed and stable  Report to RN Given: handoff report given  Patient transferred to: PACU  Comments: Resps easy and reg. Report to PACU RN   Handoff Report: Identifed the Patient, Identified the Reponsible Provider, Reviewed the pertinent medical history, Discussed the surgical course, Reviewed Intra-OP anesthesia mangement and issues during anesthesia, Set expectations for post-procedure period and Allowed opportunity for questions and acknowledgement of understanding      Vitals:  Vitals Value Taken Time   /81 04/16/24 1510   Temp 36.1  C (96.9  F) 04/16/24 1510   Pulse 87 04/16/24 1515   Resp 19 04/16/24 1515   SpO2 97 % 04/16/24 1515   Vitals shown include unfiled device data.    Electronically Signed By: KYARA Dong CRNA  April 16, 2024  3:16 PM

## 2024-04-16 NOTE — DISCHARGE INSTRUCTIONS
[facility]  Home Care Following Anesthesia  For 24 hours after surgery:  Get plenty of rest.  A responsible adult must stay with you for at least 24 hours after you leave the surgery center.  Do not drive or use heavy equipment.  If you have weakness or tingling, don't drive or use heavy equipment until this feeling goes away.   Do not drink alcohol.   Avoid strenuous or risky activities.  Ask for help when climbing stairs.  You may feel lightheaded.  IF so, sit for a few minutes before standing.  Have someone help you get up.   If you have nausea (feel sick to your stomach): Drink only clear liquids such as apple juice, ginger ale, broth or 7-Up.  Rest may also help.  Be sure to drink enough fluids.  Move to a regular diet as you feel able.   You may have a slight fever.  Call the doctor if your fever is over 100 F (37.7 C) (taken under the tongue) or lasts longer than 24 hours.  You may have a dry mouth, a sore throat, muscle aches or trouble sleeping. These should go away after 24 hours.  Do not make important or legal decisions.   It is recommended to avoid smoking.               Tips for taking pain medications  To get the best pain relief possible, remember these points:  Take pain medications as directed, before pain becomes severe.  Pain medication can upset your stomach: taking it with food may help.  Constipation is a common side effect of pain medication. Drink plenty of  fluids.  Eat foods high in fiber. Take a stool softener if recommended by your doctor or pharmacist.  Do not drink alcohol, drive or operate machinery while taking pain medications.  Ask about other ways to control pain, such as with heat, ice or relaxation.    Tylenol/Acetaminophen Consumption    If you feel your pain relief is insufficient, you may take Tylenol/Acetaminophen in addition to your narcotic pain medication.   Be careful not to exceed 4,000 mg of Tylenol/Acetaminophen in a 24 hour  period from all sources.  If you are taking extra strength Tylenol/acetaminophen (500 mg), the maximum dose is 8 tablets in 24 hours.  If you are taking regular strength acetaminophen (325 mg), the maximum dose is 12 tablets in 24 hours.    Call a doctor for any of the following:  Signs of infection (fever, growing tenderness at the surgery site, a large amount of drainage or bleeding, severe pain, foul-smelling drainage, redness, swelling).  It has been over 8 to 10 hours since surgery and you are still not able to urinate (pass water).  Headache for over 24 hours.  Numbness, tingling or weakness the day after surgery (if you had spinal anesthesia).  Signs of Covid-19 infection (temperature over 100 degrees, shortness of breath, cough, loss of taste/smell, generalized body aches, persistent headache, chills, sore throat, nausea/vomiting/diarrhea)  Your doctor is:       Dr. Noah Galeas, Plastic Surgery: [phone]               Or dial [phone] and ask for the resident on call for:  Plastics  For emergency care, call the:  [facility]:  [phone] (TTY for hearing impaired: [phone])

## 2024-04-17 NOTE — OP NOTE
Wandy Velazco    April 16, 2024    Preoperative diagnosis: Gender dysphoria     Postoperative diagnosis: same     Procedure:      1) Bilateral augmentation mammoplasties in the prepectoral space.     For the right breast a 745 ml Allergan full profile implant was utilized.  Reference SCF-745 and SN [id number].    For the left breast a 745 ml Allergan full profile implant was utilized.  Reference SCF-745 and SN [id number].     2) Adjacent tissue transfer on the right breast (lowering of the inframammary fold) 32 cm .  This was given by 2 cm in the vertical plane and 16 cm on the horizontal plane (inframammary fold) for a total of 32 cm .     3) Adjacent tissue transfer on the left breast (lowering of the inframammary fold) 30 cm .  This was given by 2 cm in the vertical plane and 15 cm on the horizontal plane (inframammary fold) for a total of 30 cm .      Surgeon: Noah Galeas MD.     Assistant: Tami Batres CSA (there was no plastic surgery resident available to assist).     Anesthesia: General     IV fluids:  ml     Findings: Macroscopically normal-appearing breasts.  Native inframammary fold was 2 cm higher on both breasts.     Specimen: None     Drains: None     Disposition: Patient tolerated procedure well and was extubated and transferred to recovery room awake and in stable condition.     Indications:     This is 43 year old biological male with diagnosis of gender dysphoria.  The patient met WPATH and insurance criteria for gender affirming top surgery.  Patient wishes to undergo bilateral augmentation mammoplasties.  Based upon patient's anatomy and skin thickness, the breast implants will be placed in the prepectoral space.  Also, it is possible that one or both inframammary folds (IMF) will have to be lowered in order to ensure proper position of the breast implant behind each nipple areolar complex (NAC).     Risks were explained to the patient and they include but are not limited to: scarring,  infection, potential explantation of one or both implants, bleeding, hematoma, seroma, temporary or permanent altered sensation on breast's skin and NAC, implant malposition, breast asymmetry, asymmetry in the location of the IMF, capsular contracture, rippling, animation deformity, the pneumothorax, chest tube placement, need for further surgeries.  Patient has acknowledged all these risks and has agreed to proceed signing informed consent.     Procedure:     Patient was identified in the preoperative holding area and preoperative IV antibiotic was given to the patient.    I then proceeded to perform preoperative markings on the patient's chest.  First I draw the midline, I also marked 4 cm bilaterally and lateral to the midline on each medial aspect of patient's breast in order to make sure that the dissection on the breast pocket will stay 4 cm laterally from the midline on each side (this was purposel done on this patient because of the lateral displacement of her native nipple areolar complexes, bilaterally.  I wanted to ensure that the center of the implant will be exactly behind of each nipple areolar complex).  This will also prevent symmastia.  Also, I marked the breast footprint on the anterior axillary line bilaterally in order to make sure that dissection of the breast pocket will not go beyond this line.     Furthermore, I marked the breast meridian on each breast as well as the native inframammary fold (IMF) of each breast.  I also measured the distance from the NAC to the IMF on each breast.     On this particular patient, the NAC to IMF distance on the right breast was 5 cm and said distance on the left breast was also 5 cm.  The ideal distance between the NAC to the IMF is around 7 to 8 cm, therefore, the IMF on both breasts will need to be lower approximately 2 cm. New IMF was marked on each breast.     I also marked the native IMF on both breasts. This native IMF will have to be obliterated in  "order to prevent \"double bubble deformity\".    The proposed incision on each inframammary fold was 6 cm length, 3 cm to each side of the breast meridian at the level of the new IMF.  This will ensure proper exposure and it will facilitate recreation of the new IMF.    I also marked the superior aspect of bilateral breast footprint.       This is how the preoperative markings looked like:                   After completing these markings, patient was then brought to the operating room and was placed supine on the operating room table.  Sequential compression devices were activated and general endotracheal anesthesia was obtained.  Then, the neck, chest and upper abdomen were prepped and draped in sterile surgical fashion.    With scalpel #15 I proceeded to make incision on the right breast IMF following the preoperative markings.  Subcutaneous tissues were divided with electrocautery until I reach the fascia of the pectoralis major muscle.  Utilizing light retractor, I proceeded to dissect the prepectoral pocket following the preoperative markings.  Once this was completed, copious irrigation with antibiotic solution was provided and I found that the hemostasis was excellent.  At this time I packed this space with a moist sponge and proceeded to perform the above mentioned steps on the contralateral breast.     On both breasts, the incision was made in the new proposed IMF which was parallel and in the same plane of the contralateral breast's IMF.  These incisions were 2 cm inferior to the native IMF.  This was done in order to ensure symmetry between both breasts. I then proceeded to obliterate both breast's native IMF scoring them with electrocautery.  This will prevent double bubble deformity as well. The length of this IMF was 16 cm on the right breast and 15 cm on the left breast.       The total square area for the adjacent tissue transfer was 32 cm  on the right breast and 30 cm  on the left breast.    Copious " "irrigation was provided as well with antibiotic solution, and I found that hemostasis was excellent.  The pocket was packed with a moist sterile sponge.    At this time I inspected the right breast and confirmed excellent hemostasis.  Utilizing patient's preoperative breast diameter, I brought a 745 ml full profile sizer which was introduced on this breast.  I also proceeded to remove the contralateral breast sponge and confirmed excellent hemostasis there as well.  I also introduced a second sizer of the same size on this breast.    Patient was then sat up and I confirmed excellent symmetry on both breasts and an harmonious relationship between the breast implant and patient's chest frame.  Therefore, I decided to utilize this volume for the permanent breast implants.    Patient was then placed back in the supine position and after the sizers were removed, I proceeded to apply the stitches to close the bilateral inframammary folds.  I applied four central interrupted stitches on the newly developed bilateral inframammary folds utilizing 2-0 PDS.  These stitches grasped the breast capsule and were anchored into the periosteum of the underlying rib.  This was very important to prevent implant malposition and bottoming out. The ends of each stitch was secured with multiple hemostats.    With this technique, I ensured that no needles will be in contact when the permanent implants are placed.     At this time, I proceeded to irrigate again both cavities with antibiotic solution followed by chlorhexidine solution.  I also proceeded to repaint the chest with chlorhexidine and applied new sterile towels on the field.  Our scrub tech, my assistant and I proceeded to change to new gloves and utilizing a Soares funnel, the permanent breast implants which were bathed in antibiotic solution, where inserted in bilateral breast pockets with the \"no touch\" technique.     All the previously applied stitches were then tied down and " bilateral IMF were closed. Subcutaneous tissues were approximated with multiple 3-0 Monocryl buried interrupted stitches. Finally the skin was closed with 4-0 V-LOC running subcuticular stitches.      Instrument count was reported by nursing personnel as correct.  Patient tolerated procedure well and was extubated and transferred to recovery room awake and in stable condition.    Noah Galeas MD , FACS   Diplomate American Board of Plastic Surgery  Diplomate American Board of Surgery  Adj. Assistant Professor of Surgery  Division of Plastic & Reconstructive Surgery   [facility]  Office: [phone]   4/16/2024 at 7:18 PM

## 2024-04-18 ENCOUNTER — OFFICE VISIT (OUTPATIENT)
Dept: PLASTIC SURGERY | Facility: AMBULATORY SURGERY CENTER | Age: 44
End: 2024-04-18
Payer: COMMERCIAL

## 2024-04-18 DIAGNOSIS — Z98.82 HISTORY OF BREAST AUGMENTATION: Primary | ICD-10-CM

## 2024-04-18 PROCEDURE — 99024 POSTOP FOLLOW-UP VISIT: CPT | Performed by: PLASTIC SURGERY

## 2024-04-18 NOTE — LETTER
4/18/2024         RE: Wandy Velazco  [address]  [address]  [facility] [address]        Dear Colleague,    Thank you for referring your patient, Wandy Velazco, to the [facility] [place]. Please see a copy of my visit note below.    Raymundo is a 43 years old lady status post bilateral breast augmentation.  She is 2 days out from surgery.  She is doing well.    At the physical exam, incisions on bilateral inframammary folds are intact.  No evidence of active bleeding.  There is no evidence of hematoma or seroma.  Good shape and symmetry bilaterally.  No evidence of infection or wound dehiscence.    Plan: Patient may have regular showers, avoid heavy lifting for the next 6 weeks, follow-up with me via telehealth in 2 weeks.  Patient is doing well.    Noah Galeas MD , FACS   Diplomate American Board of Plastic Surgery  Diplomate American Board of Surgery  Adj. Assistant Professor of Surgery  Division of Plastic & Reconstructive Surgery   [facility]  Office: [phone]   4/18/2024 at 1:52 PM             Again, thank you for allowing me to participate in the care of your patient.        Sincerely,        Noah Galeas MD

## 2024-04-18 NOTE — PROGRESS NOTES
Raymundo is a 43 years old lady status post bilateral breast augmentation.  She is 2 days out from surgery.  She is doing well.    At the physical exam, incisions on bilateral inframammary folds are intact.  No evidence of active bleeding.  There is no evidence of hematoma or seroma.  Good shape and symmetry bilaterally.  No evidence of infection or wound dehiscence.    Plan: Patient may have regular showers, avoid heavy lifting for the next 6 weeks, follow-up with me via telehealth in 2 weeks.  Patient is doing well.    Noah Galeas MD , FACS   Diplomate American Board of Plastic Surgery  Diplomate American Board of Surgery  Adj. Assistant Professor of Surgery  Division of Plastic & Reconstructive Surgery   [facility]  Office: [phone]   4/18/2024 at 1:52 PM

## 2024-05-03 ENCOUNTER — VIRTUAL VISIT (OUTPATIENT)
Dept: PLASTIC SURGERY | Facility: AMBULATORY SURGERY CENTER | Age: 44
End: 2024-05-03
Payer: COMMERCIAL

## 2024-05-03 DIAGNOSIS — Z98.82 STATUS POST BREAST AUGMENTATION: Primary | ICD-10-CM

## 2024-05-03 PROCEDURE — 99024 POSTOP FOLLOW-UP VISIT: CPT | Mod: 95 | Performed by: PLASTIC SURGERY

## 2024-05-03 NOTE — LETTER
"    5/3/2024         RE: Wandy Velazco  [address]  [place] [address]        Dear Colleague,    Thank you for referring your patient, Wandy Velazco, to the [facility] [place]. Please see a copy of my visit note below.    Wandy is a 43 year old who is being evaluated via a billable video visit.    How would you like to obtain your AVS? MyChart  If the video visit is dropped, the invitation should be resent by: Text to cell phone: [phone]  Will anyone else be joining your video visit? No      Assessment & Plan    43 years old lady status post bilateral breast mentation.  She is 3 weeks out from surgery.  She is doing well.    Avoid heavy lifting for the next 3 weeks, continue with sports bra for the next 3 weeks, apply cocoa butter lotion along the scars and follow-up with me as needed.  She is happy with her results.      BMI  Estimated body mass index is 38.37 kg/m  as calculated from the following:    Height as of 4/16/24: 1.803 m (5' 10.98\").    Weight as of 4/16/24: 124.7 kg (275 lb).             No follow-ups on file.    Subjective  Wandy is a 43 year old, presenting for the following health issues:  Consult (S/P Bilateral breast augmentation on 04/16/2024)    HPI     43 years old transgender female status post bilateral breast augmentation.  She returns for reevaluation.  She is feeling well.          Objective          Vitals:  No vitals were obtained today due to virtual visit.    Physical Exam   GENERAL: alert and no distress  PSYCH: Appropriate affect, tone, and pace of words  Incisions are well-healed on bilateral inframammary fold.  No sign of infection or dehiscence.  Good shape and symmetry of both breasts.                  Video-Visit Details    Type of service:  Video Visit   Originating Location (pt. Location): Home    Distant Location (provider location):  On-site  Platform used for Video Visit: mSitha  Signed Electronically by: Noah Galeas, " MD      Again, thank you for allowing me to participate in the care of your patient.        Sincerely,        Noah Galeas MD

## 2024-05-03 NOTE — PROGRESS NOTES
"Wandy is a 43 year old who is being evaluated via a billable video visit.    How would you like to obtain your AVS? MyChart  If the video visit is dropped, the invitation should be resent by: Text to cell phone: [phone]  Will anyone else be joining your video visit? No      Assessment & Plan     43 years old lady status post bilateral breast mentation.  She is 3 weeks out from surgery.  She is doing well.    Avoid heavy lifting for the next 3 weeks, continue with sports bra for the next 3 weeks, apply cocoa butter lotion along the scars and follow-up with me as needed.  She is happy with her results.      BMI  Estimated body mass index is 38.37 kg/m  as calculated from the following:    Height as of 4/16/24: 1.803 m (5' 10.98\").    Weight as of 4/16/24: 124.7 kg (275 lb).             No follow-ups on file.    Subjective   Wandy is a 43 year old, presenting for the following health issues:  Consult (S/P Bilateral breast augmentation on 04/16/2024)    HPI     43 years old transgender female status post bilateral breast augmentation.  She returns for reevaluation.  She is feeling well.          Objective           Vitals:  No vitals were obtained today due to virtual visit.    Physical Exam   GENERAL: alert and no distress  PSYCH: Appropriate affect, tone, and pace of words  Incisions are well-healed on bilateral inframammary fold.  No sign of infection or dehiscence.  Good shape and symmetry of both breasts.                  Video-Visit Details    Type of service:  Video Visit   Originating Location (pt. Location): Home    Distant Location (provider location):  On-site  Platform used for Video Visit: Smitha  Signed Electronically by: Noah Galeas MD    "

## 2024-07-28 ENCOUNTER — HEALTH MAINTENANCE LETTER (OUTPATIENT)
Age: 44
End: 2024-07-28

## 2025-08-10 ENCOUNTER — HEALTH MAINTENANCE LETTER (OUTPATIENT)
Age: 45
End: 2025-08-10

## (undated) DEVICE — SU PDS II 2-0 CT-1 27" Z339H

## (undated) DEVICE — ESU CLEANER TIP 31142717

## (undated) DEVICE — LINEN TOWEL PACK X5 5464

## (undated) DEVICE — SUCTION MANIFOLD NEPTUNE 2 SYS 1 PORT 702-025-000

## (undated) DEVICE — GLOVE BIOGEL PI MICRO INDICATOR UNDERGLOVE SZ 8.0 48980

## (undated) DEVICE — ESU PENCIL SMOKE EVAC W/ROCKER SWITCH 0703-047-000

## (undated) DEVICE — BOWL 32OZ STERILE 01232

## (undated) DEVICE — SUCTION SLEEVE NEPTUNE 2 165MM 0703-005-165

## (undated) DEVICE — BLADE KNIFE SURG 15 371115

## (undated) DEVICE — BASIN SET MINOR DISP

## (undated) DEVICE — DRSG ABDOMINAL 07 1/2X8" 7197D

## (undated) DEVICE — PREP DYNA-HEX 4% CHG SCRUB 4OZ BOTTLE MDS098710

## (undated) DEVICE — SU WND CLOSURE VLOC 90 ABS 4-0 18" P-12 VLOCM0023

## (undated) DEVICE — DRAPE SHEET REV FOLD 3/4 9349

## (undated) DEVICE — GLOVE BIOGEL PI MICRO INDICATOR UNDERGLOVE SZ 7.0 48970

## (undated) DEVICE — GLOVE GAMMEX NEOPRENE ULTRA SZ 7.5 LF 8515

## (undated) DEVICE — DRSG XEROFORM 5X9" 8884431605

## (undated) DEVICE — SYR BULB IRRIG DOVER 60 ML LATEX FREE 67000

## (undated) DEVICE — DRSG STERI STRIP 1/2X4" R1547

## (undated) DEVICE — STPL SKIN 35W 059037

## (undated) DEVICE — SU MONOCRYL 3-0 SH 27" UND Y416H

## (undated) DEVICE — ESU GROUND PAD ADULT W/CORD E7507

## (undated) DEVICE — SUCTION TIP YANKAUER W/O VENT K86

## (undated) DEVICE — SOL NACL 0.9% IRRIG 500ML BOTTLE 2F7123

## (undated) DEVICE — PACK MINOR CUSTOM ASC

## (undated) DEVICE — BOWL 32OZ STERILE

## (undated) DEVICE — UNIVERSAL DRAPE PACK 29118

## (undated) DEVICE — ADH LIQUID MASTISOL TOPICAL VIAL 2-3ML 0523-48

## (undated) DEVICE — DRSG TEGADERM 4X4 3/4" 1626W

## (undated) DEVICE — GLOVE BIOGEL PI MICRO SZ 6.5 48565

## (undated) DEVICE — SPONGE LAP 18X18" X8435

## (undated) DEVICE — DRAPE IOBAN INCISE 23X17" 6650EZ

## (undated) DEVICE — BNDG ELASTIC 6"X5YDS UNSTERILE 6611-60

## (undated) DEVICE — ESU ELEC BLADE HEX-LOCKING 2.5" E1450X

## (undated) DEVICE — ESU ELEC BLADE 6" COATED E1450-6

## (undated) DEVICE — BOWL STERILE 32OZ DYND50320

## (undated) DEVICE — LABEL MEDICATION SYSTEM 3303-P

## (undated) DEVICE — PREP CHLORAPREP 26ML TINTED ORANGE  260815

## (undated) RX ORDER — PROPOFOL 10 MG/ML
INJECTION, EMULSION INTRAVENOUS
Status: DISPENSED
Start: 2024-04-16

## (undated) RX ORDER — DEXAMETHASONE SODIUM PHOSPHATE 4 MG/ML
INJECTION, SOLUTION INTRA-ARTICULAR; INTRALESIONAL; INTRAMUSCULAR; INTRAVENOUS; SOFT TISSUE
Status: DISPENSED
Start: 2024-04-16

## (undated) RX ORDER — CEFAZOLIN SODIUM 1 G/3ML
INJECTION, POWDER, FOR SOLUTION INTRAMUSCULAR; INTRAVENOUS
Status: DISPENSED
Start: 2024-04-16

## (undated) RX ORDER — GENTAMICIN 40 MG/ML
INJECTION, SOLUTION INTRAMUSCULAR; INTRAVENOUS
Status: DISPENSED
Start: 2024-04-16

## (undated) RX ORDER — ONDANSETRON 2 MG/ML
INJECTION INTRAMUSCULAR; INTRAVENOUS
Status: DISPENSED
Start: 2024-04-16

## (undated) RX ORDER — TRANEXAMIC ACID 100 MG/ML
INJECTION, SOLUTION INTRAVENOUS
Status: DISPENSED
Start: 2024-04-16

## (undated) RX ORDER — OXYCODONE HYDROCHLORIDE 5 MG/1
TABLET ORAL
Status: DISPENSED
Start: 2024-04-16

## (undated) RX ORDER — HYDROMORPHONE HYDROCHLORIDE 1 MG/ML
INJECTION, SOLUTION INTRAMUSCULAR; INTRAVENOUS; SUBCUTANEOUS
Status: DISPENSED
Start: 2024-04-16

## (undated) RX ORDER — ACETAMINOPHEN 325 MG/1
TABLET ORAL
Status: DISPENSED
Start: 2024-04-16

## (undated) RX ORDER — CEFAZOLIN SODIUM 2 G/50ML
SOLUTION INTRAVENOUS
Status: DISPENSED
Start: 2024-04-16

## (undated) RX ORDER — FENTANYL CITRATE 50 UG/ML
INJECTION, SOLUTION INTRAMUSCULAR; INTRAVENOUS
Status: DISPENSED
Start: 2024-04-16